# Patient Record
Sex: MALE | Race: WHITE | URBAN - METROPOLITAN AREA
[De-identification: names, ages, dates, MRNs, and addresses within clinical notes are randomized per-mention and may not be internally consistent; named-entity substitution may affect disease eponyms.]

---

## 2017-03-08 ENCOUNTER — OFFICE VISIT (OUTPATIENT)
Dept: FAMILY MEDICINE | Facility: CLINIC | Age: 52
End: 2017-03-08
Payer: COMMERCIAL

## 2017-03-08 ENCOUNTER — RADIANT APPOINTMENT (OUTPATIENT)
Dept: GENERAL RADIOLOGY | Facility: CLINIC | Age: 52
End: 2017-03-08
Attending: FAMILY MEDICINE
Payer: COMMERCIAL

## 2017-03-08 VITALS
HEART RATE: 64 BPM | DIASTOLIC BLOOD PRESSURE: 69 MMHG | HEIGHT: 73 IN | SYSTOLIC BLOOD PRESSURE: 109 MMHG | OXYGEN SATURATION: 98 % | WEIGHT: 189 LBS | TEMPERATURE: 98.8 F | BODY MASS INDEX: 25.05 KG/M2

## 2017-03-08 DIAGNOSIS — G89.29 CHRONIC RIGHT SHOULDER PAIN: ICD-10-CM

## 2017-03-08 DIAGNOSIS — Z13.220 LIPID SCREENING: ICD-10-CM

## 2017-03-08 DIAGNOSIS — M25.511 CHRONIC RIGHT SHOULDER PAIN: ICD-10-CM

## 2017-03-08 DIAGNOSIS — R10.13 ABDOMINAL PAIN, EPIGASTRIC: Primary | ICD-10-CM

## 2017-03-08 LAB
ALBUMIN SERPL-MCNC: 3.9 G/DL (ref 3.4–5)
ALP SERPL-CCNC: 55 U/L (ref 40–150)
ALT SERPL W P-5'-P-CCNC: 41 U/L (ref 0–70)
ANION GAP SERPL CALCULATED.3IONS-SCNC: 6 MMOL/L (ref 3–14)
AST SERPL W P-5'-P-CCNC: 26 U/L (ref 0–45)
BILIRUB SERPL-MCNC: 0.7 MG/DL (ref 0.2–1.3)
BUN SERPL-MCNC: 28 MG/DL (ref 7–30)
CALCIUM SERPL-MCNC: 9.3 MG/DL (ref 8.5–10.1)
CHLORIDE SERPL-SCNC: 104 MMOL/L (ref 94–109)
CHOLEST SERPL-MCNC: 151 MG/DL
CO2 SERPL-SCNC: 30 MMOL/L (ref 20–32)
CREAT SERPL-MCNC: 1.2 MG/DL (ref 0.66–1.25)
ERYTHROCYTE [DISTWIDTH] IN BLOOD BY AUTOMATED COUNT: 12.2 % (ref 10–15)
GFR SERPL CREATININE-BSD FRML MDRD: 64 ML/MIN/1.7M2
GLUCOSE SERPL-MCNC: 82 MG/DL (ref 70–99)
HCT VFR BLD AUTO: 39.8 % (ref 40–53)
HDLC SERPL-MCNC: 32 MG/DL
HGB BLD-MCNC: 13.6 G/DL (ref 13.3–17.7)
LDLC SERPL CALC-MCNC: 96 MG/DL
MCH RBC QN AUTO: 30.4 PG (ref 26.5–33)
MCHC RBC AUTO-ENTMCNC: 34.2 G/DL (ref 31.5–36.5)
MCV RBC AUTO: 89 FL (ref 78–100)
NONHDLC SERPL-MCNC: 119 MG/DL
PLATELET # BLD AUTO: 220 10E9/L (ref 150–450)
POTASSIUM SERPL-SCNC: 4.2 MMOL/L (ref 3.4–5.3)
PROT SERPL-MCNC: 7.3 G/DL (ref 6.8–8.8)
RBC # BLD AUTO: 4.47 10E12/L (ref 4.4–5.9)
SODIUM SERPL-SCNC: 140 MMOL/L (ref 133–144)
TRIGL SERPL-MCNC: 114 MG/DL
WBC # BLD AUTO: 8.9 10E9/L (ref 4–11)

## 2017-03-08 PROCEDURE — 85027 COMPLETE CBC AUTOMATED: CPT | Performed by: FAMILY MEDICINE

## 2017-03-08 PROCEDURE — 99204 OFFICE O/P NEW MOD 45 MIN: CPT | Performed by: FAMILY MEDICINE

## 2017-03-08 PROCEDURE — 80061 LIPID PANEL: CPT | Performed by: FAMILY MEDICINE

## 2017-03-08 PROCEDURE — 73030 X-RAY EXAM OF SHOULDER: CPT | Mod: RT

## 2017-03-08 PROCEDURE — 36415 COLL VENOUS BLD VENIPUNCTURE: CPT | Performed by: FAMILY MEDICINE

## 2017-03-08 PROCEDURE — 80053 COMPREHEN METABOLIC PANEL: CPT | Performed by: FAMILY MEDICINE

## 2017-03-08 NOTE — NURSING NOTE
"Chief Complaint   Patient presents with     Gastrointestinal Problem       Initial /69 (BP Location: Right arm, Cuff Size: Adult Large)  Pulse 64  Temp 98.8  F (37.1  C) (Oral)  Ht 6' 0.5\" (1.842 m)  Wt 189 lb (85.7 kg)  SpO2 98%  BMI 25.28 kg/m2 Estimated body mass index is 25.28 kg/(m^2) as calculated from the following:    Height as of this encounter: 6' 0.5\" (1.842 m).    Weight as of this encounter: 189 lb (85.7 kg).  Medication Reconciliation: complete.  SUKHDEV Brady      "

## 2017-03-08 NOTE — PROGRESS NOTES
"  SUBJECTIVE:                                                    Constantino Mojica is a 51 year old male who presents to clinic today for the following health issues:      New Patient/Transfer of Care  GERD/Heartburn      Duration: 6 months     Description (location/character/radiation): middle of chest     Intensity:  8-9/10    Accompanying signs and symptoms:  food getting stuck: no   nausea/vomiting/blood: YES  abdominal pain: no   black/tarry or bloody stools: no :    History (similar episodes/previous evaluation): None    Precipitating or alleviating factors:  worse with no particular food or drink.  current NSAID/Aspirin use: no     Therapies tried and outcome: none     The patient has been having brief episodes of epigastric discomfort, which started about 6 months ago.  Initially, it only occurred when he was eating Chinese food.  At this point, it can happen with any type of food (last episode was with chicken).  Episodes occur not daily, but 2-3 times per week now.  He describes initially having \"an uneasy feeling\" while he is eating; this escalates into pain (not squeezing or tight) that he describes as severe indigestion.  He says it feels like he needs to belch, but he can't.  He has a little nausea.  He sometimes breathes hard during the episode.  This lasts 2-5 minutes and then resolves spontaneously.  Tums doesn't help.  He did try a tab of Pepcid during an episode, which then resolved, but he isn't sure if the pill helped or if it just resolved on its own anyway.  He does have a h/o severe GERD, but he has not had reflux symptoms for a while.  He really feels that this is different than GERD.  He denies any chest pain, or radiation of pain to neck/jaw/left arm.  He never has this pain when he is exercising, which he does on a regular basis.  There is no family h/o early CAD.  He eats very well--chicken, fish, salads, isogenic shakes.  He drinks maybe 2 beers per week.  He has 1-2 cups of coffee in the " "morning.  No regular NSAID, peppermint, chocolate.  He does not smoke.    No vomiting.  No diarrhea or constipation, no black or bloody stools.  No fevers, chills, unintended weight loss or night sweats.    His maternal grandmother had a hiatal hernia, and he wonders if that is what is going on.   No family h/o GI malignancy.  He is due for screening colonoscopy.        Right shoulder pain: chronic, going on for 1 year, no known trauma.  Pain is regularly at 2/10, which flares up to 10/10 with certain movements, particularly reaching behind him to /put on his back pack.  The pain is primarily deep anteriorly.  He also notes his right trapezius is chronically sore; some days he has limited ROM of his neck.  He does go to a chiropractor, which helps in some ways, though they can't seem to decrease muscle spasm in the trapezius.  No weakness, numbness or tingling in the RUE.        Problem list and histories reviewed & adjusted, as indicated.  Additional history: as documented    There is no problem list on file for this patient.    Past Surgical History   Procedure Laterality Date     Appendectomy         Social History   Substance Use Topics     Smoking status: Not on file     Smokeless tobacco: Not on file     Alcohol use Not on file     Family History   Problem Relation Age of Onset     Heart Failure Maternal Grandfather          Current Outpatient Prescriptions   Medication Sig Dispense Refill     Multiple Vitamins-Minerals (MULTIVITAMIN ADULT PO)        Allergies   Allergen Reactions     Penicillins               ROS:  Constitutional, HEENT, cardiovascular, pulmonary, gi and gu systems are negative, except as otherwise noted.    OBJECTIVE:                                                    /69 (BP Location: Right arm, Cuff Size: Adult Large)  Pulse 64  Temp 98.8  F (37.1  C) (Oral)  Ht 6' 0.5\" (1.842 m)  Wt 189 lb (85.7 kg)  SpO2 98%  BMI 25.28 kg/m2  Body mass index is 25.28 kg/(m^2).  GENERAL " APPEARANCE: healthy, alert and no distress  EYES: Eyes grossly normal to inspection, PERRL and conjunctivae and sclerae normal  NECK: no adenopathy, no asymmetry, masses, or scars and thyroid normal to palpation  RESP: lungs clear to auscultation - no rales, rhonchi or wheezes  CV: regular rates and rhythm, normal S1 S2, no S3 or S4 and no murmur, click or rub  ABDOMEN: soft, nontender, without hepatosplenomegaly or masses and bowel sounds normal  MS: right shoulder is without erythema, swelling or other obvious deformity.  It is nontender.  He has pain with external rotation against resistance and a mildly positive empty can test.    PSYCH: mentation appears normal and affect normal/bright    Diagnostic Test Results:  none      ASSESSMENT/PLAN:                                                    1.  Epigastric discomfort: most likely to be GI-related, sounding most c/w with esophageal spasm, but could be related to a hiatal hernia if present, gerd, gastritis perhaps a little less likely as he never has symptoms other than when he is actually eating.  I did also consider cardiac etiology of this pain, however, given that it only occurs with eating and is never triggered by exercise or any other times in this patient who is very physically active and has no risk factors for heart disease, I think this is less likely.  I suggested he try a short course of PPI, but he feels strongly that this is not GERD and would appreciate consultation with a GI specialist.  Referral done.  He has no red flag symptoms such as ongoing pain, black/bloody stools, fever or unintended weight loss.       2.  Right shoulder pain: suspect rotator cuff tendinosis or possible tear, will check plain films to start, but would anticipate needing MRI; referral done for orthopedics.      Yasmin Doran MD  [facility]

## 2017-03-08 NOTE — MR AVS SNAPSHOT
After Visit Summary   3/8/2017    Constantino Mojica    MRN: [id number]           Patient Information     Date Of Birth          1965        Visit Information        Provider Department      3/8/2017 8:40 AM Yasmin Doran MD [facility]        Today's Diagnoses     Abdominal pain, epigastric    -  1    Lipid screening        Chronic right shoulder pain           Follow-ups after your visit        Additional Services     GASTROENTEROLOGY ADULT REF CONSULT ONLY       Preferred Location: [facility] [phone]      Please be aware that coverage of these services is subject to the terms and limitations of your health insurance plan.  Call member services at your health plan with any benefit or coverage questions.  Any procedures must be performed at a [place] facility OR coordinated by your clinic's referral office.    Please bring the following with you to your appointment:    (1) Any X-Rays, CTs or MRIs which have been performed.  Contact the facility where they were done to arrange for  prior to your scheduled appointment.    (2) List of current medications   (3) This referral request   (4) Any documents/labs given to you for this referral            ORTHOPEDICS ADULT REFERRAL       Your provider has referred you to: [facility] [phone]   [email or web address] [phone]   [email or web address]    Please be aware that coverage of these services is subject to the terms and limitations of your health insurance plan.  Call member services at your health plan with any benefit or coverage questions.      Please bring the following to your appointment:    >>   Any x-rays, CTs or MRIs which have been performed.  Contact the facility where they were done to arrange for  prior to your scheduled appointment.    >>   List of current medications   >>   This referral request   >>   Any  "documents/labs given to you for this referral                  Who to contact     If you have questions or need follow up information about today's clinic visit or your schedule please contact [facility] directly at [phone].  Normal or non-critical lab and imaging results will be communicated to you by MyChart, letter or phone within 4 business days after the clinic has received the results. If you do not hear from us within 7 days, please contact the clinic through MyChart or phone. If you have a critical or abnormal lab result, we will notify you by phone as soon as possible.  Submit refill requests through [employer] or call your pharmacy and they will forward the refill request to us. Please allow 3 business days for your refill to be completed.          Additional Information About Your Visit        [employer] Information     [employer] lets you send messages to your doctor, view your test results, renew your prescriptions, schedule appointments and more. To sign up, go to [email or web address] . Click on \"Log in\" on the left side of the screen, which will take you to the Welcome page. Then click on \"Sign up Now\" on the right side of the page.     You will be asked to enter the access code listed below, as well as some personal information. Please follow the directions to create your username and password.     Your access code is: [email or web address]  Expires: 2017  9:06 AM     Your access code will  in 90 days. If you need help or a new code, please call your [place] clinic or [phone].        Care EveryWhere ID     This is your Care EveryWhere ID. This could be used by other organizations to access your [place] medical records  [id number]        Your Vitals Were     Pulse Temperature Height Pulse Oximetry BMI (Body Mass Index)       64 98.8  F (37.1  C) (Oral) 6' 0.5\" (1.842 m) 98% 25.28 kg/m2        Blood Pressure from Last 3 Encounters:   17 109/69    Weight from Last 3 " Encounters:   03/08/17 189 lb (85.7 kg)              We Performed the Following     CBC with platelets     Comprehensive metabolic panel     GASTROENTEROLOGY ADULT REF CONSULT ONLY     Lipid Profile     ORTHOPEDICS ADULT REFERRAL        Primary Care Provider    None Specified       No primary provider on file.        Thank you!     Thank you for choosing [facility]  for your care. Our goal is always to provide you with excellent care. Hearing back from our patients is one way we can continue to improve our services. Please take a few minutes to complete the written survey that you may receive in the mail after your visit with us. Thank you!             Your Updated Medication List - Protect others around you: Learn how to safely use, store and throw away your medicines at www.disposemymeds.org.          This list is accurate as of: 3/8/17  9:13 AM.  Always use your most recent med list.                   Brand Name Dispense Instructions for use    MULTIVITAMIN ADULT PO

## 2017-03-10 ENCOUNTER — PRE VISIT (OUTPATIENT)
Dept: GASTROENTEROLOGY | Facility: CLINIC | Age: 52
End: 2017-03-10

## 2017-03-10 ENCOUNTER — TRANSFERRED RECORDS (OUTPATIENT)
Dept: HEALTH INFORMATION MANAGEMENT | Facility: CLINIC | Age: 52
End: 2017-03-10

## 2017-03-10 NOTE — TELEPHONE ENCOUNTER
1.  Date/reason for appt: 5/3/17- Abdominal Pain      2.  Referring provider: Dr. Doran - internal referral     3.  Call to patient (Yes / No - short description): No, referring physician note is in EPIC. No imaging was done.

## 2017-03-13 ENCOUNTER — DOCUMENTATION ONLY (OUTPATIENT)
Dept: FAMILY MEDICINE | Facility: CLINIC | Age: 52
End: 2017-03-13

## 2017-03-14 ENCOUNTER — TRANSFERRED RECORDS (OUTPATIENT)
Dept: HEALTH INFORMATION MANAGEMENT | Facility: CLINIC | Age: 52
End: 2017-03-14

## 2017-04-04 ENCOUNTER — TRANSFERRED RECORDS (OUTPATIENT)
Dept: HEALTH INFORMATION MANAGEMENT | Facility: CLINIC | Age: 52
End: 2017-04-04

## 2017-04-13 ENCOUNTER — MYC MEDICAL ADVICE (OUTPATIENT)
Dept: FAMILY MEDICINE | Facility: CLINIC | Age: 52
End: 2017-04-13

## 2017-04-20 ENCOUNTER — OFFICE VISIT (OUTPATIENT)
Dept: FAMILY MEDICINE | Facility: CLINIC | Age: 52
End: 2017-04-20
Payer: COMMERCIAL

## 2017-04-20 VITALS
DIASTOLIC BLOOD PRESSURE: 66 MMHG | RESPIRATION RATE: 18 BRPM | WEIGHT: 188.2 LBS | HEART RATE: 67 BPM | OXYGEN SATURATION: 96 % | SYSTOLIC BLOOD PRESSURE: 99 MMHG | BODY MASS INDEX: 25.17 KG/M2 | TEMPERATURE: 97.9 F

## 2017-04-20 DIAGNOSIS — S83.8X2D MENISCAL INJURY, LEFT, SUBSEQUENT ENCOUNTER: ICD-10-CM

## 2017-04-20 DIAGNOSIS — Z01.818 PREOP GENERAL PHYSICAL EXAM: Primary | ICD-10-CM

## 2017-04-20 LAB — HGB BLD-MCNC: 14.6 G/DL (ref 13.3–17.7)

## 2017-04-20 PROCEDURE — 85018 HEMOGLOBIN: CPT | Performed by: FAMILY MEDICINE

## 2017-04-20 PROCEDURE — 36415 COLL VENOUS BLD VENIPUNCTURE: CPT | Performed by: FAMILY MEDICINE

## 2017-04-20 PROCEDURE — 99214 OFFICE O/P EST MOD 30 MIN: CPT | Performed by: FAMILY MEDICINE

## 2017-04-20 NOTE — PROGRESS NOTES
[address] [address]  [phone]  Dept: [phone]    PRE-OP EVALUATION:  Today's date: 2017    Constantino Mojica (: 1965) presents for pre-operative evaluation assessment as requested by Dr. Lynn.  He requires evaluation and anesthesia risk assessment prior to undergoing surgery/procedure for treatment of Left knee-Meniscus .  Proposed procedure: Meniscus repair     Date of Surgery/ Procedure: 2017  Time of Surgery/ Procedure: to be determined   Hospital/Surgical Facility: [facility]     Primary Physician: Yasmin Doran  Type of Anesthesia Anticipated: to be determined    Patient has a Health Care Directive or Living Will:  NO    Preop Questions 2017   1.  Do you have a history of heart attack, stroke, stent, bypass or surgery on an artery in the head, neck, heart or legs? No   2.  Do you ever have any pain or discomfort in your chest? No   3.  Do you have a history of  Heart Failure? No   4.   Are you troubled by shortness of breath when:  walking on a level surface, or up a slight hill, or at night? No   5.  Do you currently have a cold, bronchitis or other respiratory infection? No   6.  Do you have a cough, shortness of breath, or wheezing? No   7.  Do you sometimes get pains in the calves of your legs when you walk? No   8. Do you or anyone in your family have previous history of blood clots? No   9.  Do you or does anyone in your family have a serious bleeding problem such as prolonged bleeding following surgeries or cuts? No   10. Have you ever had problems with anemia or been told to take iron pills? No   11. Have you had any abnormal blood loss such as black, tarry or bloody stools? No   12. Have you ever had a blood transfusion? No   13. Have you or any of your relatives ever had problems with anesthesia? No   14. Do you have sleep apnea, excessive snoring or daytime drowsiness? No   15. Do you have any  prosthetic heart valves? No   16. Do you have prosthetic joints? No         HPI:                                                      Brief HPI related to upcoming procedure: left meniscal injury a year ago, a tear was seen on MRI with [facility].  He did not have pain for the most part and decided to wait on surgery.  However, when he tried biking this spring, it exacerbated a sharp pain in the left knee that is worse after exercise and worse with knee extension.  It does not lock or give way.  No redness.  He plans on surgical intervention.  He has had meniscal surgery on the right knee in the past.     GERD/hiatal hernia: determined to likely have a small hiatal hernia; has had EGD with GI, no other concerning findings.  He is managing conservatively and is not taking any medication at this time.  No abdominal pain, dysphagia, weight loss fever, or melena.     Right shoulder pain: he had an MRI with [facility], no tears found, and he improved greatly with physical therapy.            MEDICAL HISTORY:                                                    There are no active problems to display for this patient.     Past Medical History:   Diagnosis Date     GERD (gastroesophageal reflux disease)      Past Surgical History:   Procedure Laterality Date     APPENDECTOMY       Current Outpatient Prescriptions   Medication Sig Dispense Refill     Multiple Vitamins-Minerals (MULTIVITAMIN ADULT PO)        OTC products: Multi vit, vitamin B, fish oil, glucosamine chondroitin    Allergies   Allergen Reactions     Penicillins       Latex Allergy: NO    Social History   Substance Use Topics     Smoking status: Not on file     Smokeless tobacco: Not on file     Alcohol use Not on file     History   Drug Use Not on file       REVIEW OF SYSTEMS:                                                    Constitutional, neuro, ENT, endocrine, pulmonary, cardiac, gastrointestinal, genitourinary, musculoskeletal, integument and  psychiatric systems are negative, except as otherwise noted.    EXAM:                                                    There were no vitals taken for this visit.    GENERAL APPEARANCE: healthy, alert and no distress     EYES: EOMI,  PERRL     HENT: ear canals and TM's normal and nose and mouth without ulcers or lesions     NECK: no adenopathy, no asymmetry, masses, or scars and thyroid normal to palpation     RESP: lungs clear to auscultation - no rales, rhonchi or wheezes     CV: regular rates and rhythm, normal S1 S2, no S3 or S4 and no murmur, click or rub     ABDOMEN:  soft, nontender, no HSM or masses and bowel sounds normal     NEURO: Normal strength and tone, sensory exam grossly normal, mentation intact and speech normal     PSYCH: mentation appears normal. and affect normal/bright    DIAGNOSTICS:                                                      Labs Resulted Today:   Results for orders placed or performed in visit on 03/08/17   XR Shoulder Right G/E 3 Views    Narrative    XR SHOULDER RT G/E 3 VW 3/8/2017 9:37 AM    HISTORY: Right shoulder pain.    COMPARISON: None.    FINDINGS: No fracture or malalignment. No significant osseous  degenerative change. Osseous structures are within normal limits.      Impression    IMPRESSION: No osseous abnormality to explain pain.    DOUGLAS GREGG MD       Recent Labs   Lab Test  03/08/17   0919   HGB  13.6   PLT  220   NA  140   POTASSIUM  4.2   CR  1.20      Hemoglobin   Date Value Ref Range Status   04/20/2017 14.6 13.3 - 17.7 g/dL Final   ]      IMPRESSION:                                                    Reason for surgery/procedure: left meniscal injury  Diagnosis/reason for consult: pre-operative evaluation    The proposed surgical procedure is considered INTERMEDIATE risk.    REVISED CARDIAC RISK INDEX  The patient has the following serious cardiovascular risks for perioperative complications such as (MI, PE, VFib and 3  AV Block):  No serious cardiac  risks  INTERPRETATION: 0 risks: Class I (very low risk - 0.4% complication rate)    The patient has the following additional risks for perioperative complications:  No identified additional risks    No diagnosis found.    RECOMMENDATIONS:                                                      -no fish oil or NSAIDS the week prior to surgery.        APPROVAL GIVEN to proceed with proposed procedure, without further diagnostic evaluation       Signed Electronically by: Yasmin Doran MD    Copy of this evaluation report is provided to requesting physician.    [place] Preop Guidelines

## 2017-04-20 NOTE — NURSING NOTE
"Chief Complaint   Patient presents with     Pre-Op Exam       Initial BP 99/66 (BP Location: Left arm, Patient Position: Chair, Cuff Size: Adult Regular)  Pulse 67  Temp 97.9  F (36.6  C) (Oral)  Resp 18  Wt 188 lb 3.2 oz (85.4 kg)  SpO2 96%  BMI 25.17 kg/m2 Estimated body mass index is 25.17 kg/(m^2) as calculated from the following:    Height as of 3/8/17: 6' 0.5\" (1.842 m).    Weight as of this encounter: 188 lb 3.2 oz (85.4 kg).  Medication Reconciliation: complete Roxanne Pearson/    "

## 2017-05-05 ENCOUNTER — TRANSFERRED RECORDS (OUTPATIENT)
Dept: HEALTH INFORMATION MANAGEMENT | Facility: CLINIC | Age: 52
End: 2017-05-05

## 2018-06-21 ENCOUNTER — TRANSFERRED RECORDS (OUTPATIENT)
Dept: HEALTH INFORMATION MANAGEMENT | Facility: CLINIC | Age: 53
End: 2018-06-21

## 2018-12-26 ENCOUNTER — OFFICE VISIT (OUTPATIENT)
Dept: FAMILY MEDICINE | Facility: CLINIC | Age: 53
End: 2018-12-26
Payer: COMMERCIAL

## 2018-12-26 VITALS
OXYGEN SATURATION: 99 % | BODY MASS INDEX: 25.28 KG/M2 | RESPIRATION RATE: 18 BRPM | TEMPERATURE: 97.5 F | HEART RATE: 76 BPM | SYSTOLIC BLOOD PRESSURE: 116 MMHG | WEIGHT: 189 LBS | DIASTOLIC BLOOD PRESSURE: 81 MMHG

## 2018-12-26 DIAGNOSIS — R42 DIZZINESS: Primary | ICD-10-CM

## 2018-12-26 LAB
ALBUMIN SERPL-MCNC: 3.9 G/DL (ref 3.4–5)
ALP SERPL-CCNC: 67 U/L (ref 40–150)
ALT SERPL W P-5'-P-CCNC: 40 U/L (ref 0–70)
ANION GAP SERPL CALCULATED.3IONS-SCNC: 9 MMOL/L (ref 3–14)
AST SERPL W P-5'-P-CCNC: 25 U/L (ref 0–45)
BILIRUB SERPL-MCNC: 0.5 MG/DL (ref 0.2–1.3)
BUN SERPL-MCNC: 24 MG/DL (ref 7–30)
CALCIUM SERPL-MCNC: 9.1 MG/DL (ref 8.5–10.1)
CHLORIDE SERPL-SCNC: 108 MMOL/L (ref 94–109)
CO2 SERPL-SCNC: 23 MMOL/L (ref 20–32)
CREAT SERPL-MCNC: 1.05 MG/DL (ref 0.66–1.25)
ERYTHROCYTE [DISTWIDTH] IN BLOOD BY AUTOMATED COUNT: 12.2 % (ref 10–15)
GFR SERPL CREATININE-BSD FRML MDRD: 81 ML/MIN/{1.73_M2}
GLUCOSE SERPL-MCNC: 79 MG/DL (ref 70–99)
HCT VFR BLD AUTO: 43.7 % (ref 40–53)
HGB BLD-MCNC: 14.9 G/DL (ref 13.3–17.7)
MCH RBC QN AUTO: 30.3 PG (ref 26.5–33)
MCHC RBC AUTO-ENTMCNC: 34.1 G/DL (ref 31.5–36.5)
MCV RBC AUTO: 89 FL (ref 78–100)
PLATELET # BLD AUTO: 228 10E9/L (ref 150–450)
POTASSIUM SERPL-SCNC: 4.2 MMOL/L (ref 3.4–5.3)
PROT SERPL-MCNC: 7.2 G/DL (ref 6.8–8.8)
RBC # BLD AUTO: 4.92 10E12/L (ref 4.4–5.9)
SODIUM SERPL-SCNC: 140 MMOL/L (ref 133–144)
TSH SERPL DL<=0.005 MIU/L-ACNC: 2.32 MU/L (ref 0.4–4)
WBC # BLD AUTO: 6.1 10E9/L (ref 4–11)

## 2018-12-26 PROCEDURE — 84443 ASSAY THYROID STIM HORMONE: CPT | Performed by: FAMILY MEDICINE

## 2018-12-26 PROCEDURE — 36415 COLL VENOUS BLD VENIPUNCTURE: CPT | Performed by: FAMILY MEDICINE

## 2018-12-26 PROCEDURE — 99213 OFFICE O/P EST LOW 20 MIN: CPT | Performed by: FAMILY MEDICINE

## 2018-12-26 PROCEDURE — 85027 COMPLETE CBC AUTOMATED: CPT | Performed by: FAMILY MEDICINE

## 2018-12-26 PROCEDURE — 80053 COMPREHEN METABOLIC PANEL: CPT | Performed by: FAMILY MEDICINE

## 2018-12-26 NOTE — PROGRESS NOTES
SUBJECTIVE:   Constantino Mojica is a 53 year old male who presents to clinic today for the following health issues:    Dizziness      Duration: x3 weeks     Description   Feeling faint:  no   Feeling like the surroundings are moving: no   Loss of consciousness or falls: no     Intensity:  mild    Accompanying signs and symptoms: lightheaded   Nausea/vomitting: no   Palpitations: no   Weakness in arms or legs: no   Vision or speech changes: no   Ringing in ears (Tinnitus): no   Hearing loss related to dizziness: NO  Other (fevers/chills/sweating/dyspnea): no     History (similar episodes/head trauma/previous evaluation/recent bleeding): None    Precipitating or alleviating factors (new meds/chemicals): None  Worse with activity/head movement: YES- when sitting at desk and sitting from getting up     Therapies tried and outcome: Sudafed outcome not effective     About three weeks ago, he had been in his usual state of good health when he got up from bed in the morning and had about a 3 minute period of vertigo.  The episode resolved spontaneously, and the vertigo has not recurred, however, he has continued to have intermittent light-headedness since then.  He does not think he has this every day.  It occurs at rest and with getting up from seated, but is not necessary worse with position change.  He is otherwise feeling very well and denies palpitations, chest pain, shortness of breath, headache, hearing loss, tinnitus, URI symptoms such as nasal congestion, cough or sore throat; states urination and bowel movements.  No polyuria or polydipsia.  He thinks he may need to get glasses, as his far vision is off.  He has been trying to exercise regularly and eat well.  He notes he does NOT have any dizziness with exertion.            Problem list and histories reviewed & adjusted, as indicated.  Additional history: as documented    There is no problem list on file for this patient.    Past Surgical History:   Procedure  Laterality Date     APPENDECTOMY       COLONOSCOPY  04/04/2017    All clear - not needed again until age 61     ORTHOPEDIC SURGERY  2011    Meniscus - Right Knee       Social History     Tobacco Use     Smoking status: Never Smoker     Smokeless tobacco: Never Used   Substance Use Topics     Alcohol use: Yes     Comment: 2-4 beers socially on weekends     Family History   Problem Relation Age of Onset     Heart Failure Maternal Grandfather      Diabetes Mother      Hypertension Mother      Diabetes Father      Hyperlipidemia Father          Current Outpatient Medications   Medication Sig Dispense Refill     Multiple Vitamins-Minerals (MULTIVITAMIN ADULT PO)        Allergies   Allergen Reactions     Penicillins        Reviewed and updated as needed this visit by clinical staff  Tobacco  Allergies  Meds  Med Hx  Surg Hx  Fam Hx  Soc Hx      Reviewed and updated as needed this visit by Provider         ROS:  Constitutional, HEENT, cardiovascular, pulmonary, GI, , musculoskeletal, neuro, skin, endocrine and psych systems are negative, except as otherwise noted.    OBJECTIVE:     /81 (BP Location: Right arm, Patient Position: Standing, Cuff Size: Adult Large)   Pulse 76   Temp 97.5  F (36.4  C) (Oral)   Resp 18   Wt 85.7 kg (189 lb)   SpO2 99%   BMI 25.28 kg/m    Body mass index is 25.28 kg/m .  GENERAL: healthy, alert and no distress  EYES: Eyes grossly normal to inspection, PERRL and conjunctivae and sclerae normal  HENT: ear canals and TM's normal, nose and mouth without ulcers or lesions.  Eder-Hallpike normal.  NECK: no adenopathy, no asymmetry, masses, or scars and thyroid normal to palpation  RESP: lungs clear to auscultation - no rales, rhonchi or wheezes  CV: regular rate, bradycardic, normal S1 S2, no S3 or S4, no murmur, click or rub, no peripheral edema and peripheral pulses strong  ABDOMEN: soft, nontender, and bowel sounds normal  MS: no gross musculoskeletal defects noted, no  edema  SKIN: no suspicious lesions or rashes  NEURO: Normal strength and tone, mentation intact and speech normal  CN II: visual fields intact to confrontation  CN III, IV, VI: EOM intact, pupils equal and reactive  CN V: facial sensation nl  CN VII: face symmetric, no facial droop  CN VIII: hearing normal to conversation  CN IX: palate elevation symmetric, uvula at midline  CN XI SCM normal, shoulder shrug nl  CN XII: tongue midline  Negative Rhomberg  Normal gait and station  Normal finger-to-nose  PSYCH: mentation appears normal, affect normal/bright        ASSESSMENT/PLAN:             1. Dizziness  Symptoms not really consistent with BPPV given only the first episode was vertiginous, and his symptoms now are lightheadedness which can happen just sitting at rest and not necessarily with position changes, though getting up from seated can do it.  His orthostatics do not show orthostasis, though his pulse rate does rise from 54-76.  His resting pulse is bradycardic, but rate was in the 60s the past two visits.  Further, he has no symptoms with exertion, making a cardiac etiology a lot less likely as well.  His hematocrit the last check was just below the lower limit of normal last year; will recheck this to see if anemia may be the cause.  Will also ensure there are no electrolyte derangements, or unlikely evidence of liver, kidney or thyroid dysfunction; with no other associated complaints, this seems unlikely.   I've recommended he increase his hydration, have his eyes checked, and let me know if symptoms persist.    - Comprehensive metabolic panel  - CBC with platelets  - TSH with free T4 reflex        Yasmin Doran MD  [facility]

## 2018-12-26 NOTE — NURSING NOTE
Vitals:    12/26/18 0721 12/26/18 0742 12/26/18 0743   BP: 105/69 113/72 116/81   BP Location: Right arm Right arm Right arm   Patient Position: Sitting Supine Standing   Cuff Size: Adult Large Adult Large Adult Large   Pulse: 57 54 76   Resp: 18     Temp: 97.5  F (36.4  C)     TempSrc: Oral     SpO2: 99%     Weight: 85.7 kg (189 lb)           José Antonio Montenegro MA

## 2019-03-12 ENCOUNTER — TRANSFERRED RECORDS (OUTPATIENT)
Dept: HEALTH INFORMATION MANAGEMENT | Facility: CLINIC | Age: 54
End: 2019-03-12

## 2019-05-08 ENCOUNTER — TRANSFERRED RECORDS (OUTPATIENT)
Dept: HEALTH INFORMATION MANAGEMENT | Facility: CLINIC | Age: 54
End: 2019-05-08

## 2019-05-30 ENCOUNTER — TELEPHONE (OUTPATIENT)
Dept: FAMILY MEDICINE | Facility: CLINIC | Age: 54
End: 2019-05-30

## 2019-05-30 DIAGNOSIS — R42 DIZZINESS: Primary | ICD-10-CM

## 2019-05-30 NOTE — TELEPHONE ENCOUNTER
Rerouting to covering providers because PCP is out today. Please review and advise. Neuro referral or wait for PCP?    Thanks  Ana Laura Hui RN, BSN   [facility]

## 2019-05-30 NOTE — TELEPHONE ENCOUNTER
"Pt is having problems with dizziness. Pt states that \"it comes out of nowhere and the room start spinning\" Pt would like a nurse to call and discuss. Ok to leave message. Thanks!    Leigha Newell  [employer] [facility] Rep    "

## 2019-05-30 NOTE — TELEPHONE ENCOUNTER
I would guess the best place would be to be seen again or refer to balance [facility]ibular physical therapy. But I haven not seen him. Maybe he would want to wait for Dr. Doran's advice.     Hayden New

## 2019-05-30 NOTE — TELEPHONE ENCOUNTER
"Dr. Doran -- Pt called again with return of dizziness. Was seen 12/2018 and you advised him to have eyes checked and keep up fluids. He had eye exam in January -- no changes. He has been drinking about 90 oz water daily.     Symptoms came back, kind of worse. He describes \"violent\" room spinning. Takes a couple minutes to resolve, then feeling nauseous. It has awakened him at night. Today happened while sitting at work talking to colleague.    He has been taking Claritin daily for allergies -- does not feel head is stuffy. No other medications. He is \"pretty active\"    No headaches, numbness or tingling. Does not feel faint. Does feel nauseous. During these dizzy moments he said he can \"hardly see.\"     Daily caffeine -- 1-2 cups coffee  Alcohol -- casual, weekends only  No other drug use    Pt is concerned. Should he come back to you or see neurology or someone else? Please advise. Pt said he is feeling \"fine now.\" Maybe lightly dizzy after severe episode this morning.     Thanks!  Ana Laura Hui, RN, BSN   [facility]         "

## 2019-05-30 NOTE — TELEPHONE ENCOUNTER
Left message for patient that covering provider reviewed and advised wait for PCP advice.    Triage to f/u with patient after Dr. Doran reviews 5/31    Ana Laura Hui, RN, BSN   [facility]

## 2019-05-30 NOTE — TELEPHONE ENCOUNTER
Left message for patient to call back and speak to a nurse.    Ana Laura Hui, RN, BSN   [facility]

## 2019-05-31 NOTE — TELEPHONE ENCOUNTER
would recommend [facility] (referral done). Thanks.  I can see him first if he prefers, but likely fine to just go for rehab.

## 2019-05-31 NOTE — TELEPHONE ENCOUNTER
Referral info given to patient and he is fine to go right to the balance center.  He asked to cancel his appt with Dr. Doran and this was done.  Told him to let us know if he cannot get in within a timely manner.   Roxanne Yoder RN

## 2019-09-04 ENCOUNTER — TRANSFERRED RECORDS (OUTPATIENT)
Dept: HEALTH INFORMATION MANAGEMENT | Facility: CLINIC | Age: 54
End: 2019-09-04

## 2020-03-10 ENCOUNTER — HEALTH MAINTENANCE LETTER (OUTPATIENT)
Age: 55
End: 2020-03-10

## 2020-06-19 ENCOUNTER — TRANSFERRED RECORDS (OUTPATIENT)
Dept: HEALTH INFORMATION MANAGEMENT | Facility: CLINIC | Age: 55
End: 2020-06-19

## 2020-08-28 ENCOUNTER — TRANSFERRED RECORDS (OUTPATIENT)
Dept: HEALTH INFORMATION MANAGEMENT | Facility: CLINIC | Age: 55
End: 2020-08-28

## 2020-12-27 ENCOUNTER — HEALTH MAINTENANCE LETTER (OUTPATIENT)
Age: 55
End: 2020-12-27

## 2021-04-24 ENCOUNTER — HEALTH MAINTENANCE LETTER (OUTPATIENT)
Age: 56
End: 2021-04-24

## 2021-10-09 ENCOUNTER — HEALTH MAINTENANCE LETTER (OUTPATIENT)
Age: 56
End: 2021-10-09

## 2022-05-21 ENCOUNTER — HEALTH MAINTENANCE LETTER (OUTPATIENT)
Age: 57
End: 2022-05-21

## 2022-09-11 ENCOUNTER — HEALTH MAINTENANCE LETTER (OUTPATIENT)
Age: 57
End: 2022-09-11

## 2023-06-03 ENCOUNTER — HEALTH MAINTENANCE LETTER (OUTPATIENT)
Age: 58
End: 2023-06-03